# Patient Record
Sex: MALE | Race: WHITE | NOT HISPANIC OR LATINO | Employment: FULL TIME | URBAN - METROPOLITAN AREA
[De-identification: names, ages, dates, MRNs, and addresses within clinical notes are randomized per-mention and may not be internally consistent; named-entity substitution may affect disease eponyms.]

---

## 2020-07-07 ENCOUNTER — CONVERSION ENCOUNTER (OUTPATIENT)
Dept: ORTHOPEDIC SURGERY | Facility: CLINIC | Age: 50
End: 2020-07-07

## 2020-07-07 ENCOUNTER — OFFICE VISIT CONVERTED (OUTPATIENT)
Dept: ORTHOPEDIC SURGERY | Facility: CLINIC | Age: 50
End: 2020-07-07
Attending: ORTHOPAEDIC SURGERY

## 2021-05-10 NOTE — H&P
"   History and Physical      Patient Name: Xiang Russell   Patient ID: [id number]   Sex: Male   YOB: 1970        Visit Date: July 7, 2020    Provider: Magaly Cormier MD   Location: [facility]   Location Address: [address] [place] [address]  [id number]   Location Phone: [phone]          Chief Complaint  · Left Knee Pain      History Of Present Illness  Xiang Russell is a 50 year old /White male who presents for evaluation of his left knee. He injured it a few years ago when he was working at [employer], kind of like a crush injury. Subsequently, he kind of healed from that and was able to run a half-marathon and do pretty well, but in the last few months, he started having increasing pain and was concerned. Another doctor told him he thought he might have a meniscus tear.       Past Medical History  Stroke; Thyroid disorder         Past Surgical History  heart surgery; Tonsillectomy         Medication List  levothyroxine 125 mcg oral tablet         Allergy List  NO KNOWN DRUG ALLERGIES; NO KNOWN DRUG ALLERGIES         Family Medical History  Stroke         Social History  Alcohol Use (Never); .; lives with other; Recreational Drug Use (Never); Tobacco (Never); Working         Review of Systems  · Constitutional  o Denies  o : fever, chills, weight loss  · Cardiovascular  o Denies  o : chest pain, shortness of breath  · Gastrointestinal  o Denies  o : liver disease, heartburn, nausea, blood in stools  · Genitourinary  o Denies  o : painful urination, blood in urine  · Integument  o Denies  o : rash, itching  · Neurologic  o Denies  o : headache, weakness, loss of consciousness  · Musculoskeletal  o Admits  o : painful, swollen joints  · Psychiatric  o Denies  o : drug/alcohol addiction, anxiety, depression      Vitals  Date Time BP Position Site L\R Cuff Size HR RR TEMP (F) WT  HT  BMI kg/m2 BSA m2 O2 Sat HC       07/07/2020 02:20 PM      47 - R   170lbs 0oz 5'  5\" 28.29 1.88 97 %  "         Physical Examination  · Constitutional  o Appearance  o : well developed, well-nourished, no obvious deformities present  · Head and Face  o Head  o :   § Inspection  § : normocephalic  o Face  o :   § Inspection  § : no facial lesions  · Eyes  o Conjunctivae  o : conjunctivae normal  o Sclerae  o : sclerae white  · Ears, Nose, Mouth and Throat  o Ears  o :   § External Ears  § : appearance within normal limits  § Hearing  § : intact  o Nose  o :   § External Nose  § : appearance normal  · Neck  o Inspection/Palpation  o : normal appearance  o Range of Motion  o : full range of motion  · Respiratory  o Respiratory Effort  o : breathing unlabored  o Inspection of Chest  o : normal appearance  o Auscultation of Lungs  o : no audible wheezing or rales  · Cardiovascular  o Heart  o : regular rate  · Gastrointestinal  o Abdominal Examination  o : soft and non-tender  · Skin and Subcutaneous Tissue  o General Inspection  o : intact, no rashes  · Psychiatric  o General  o : Alert and oriented x3  o Judgement and Insight  o : judgment and insight intact  o Mood and Affect  o : mood normal, affect appropriate  · Left Knee  o Inspection  o : Very [id number]-zo pain. Good range of motion. Nontender to palpation. Sensation intact. Pulses normal.  · In Office Procedures  o View  o : LAT/SUNRISE/STANDING   o Site  o : left knee  o Indication  o : Left knee pain   o Study  o : X-rays ordered, taken in the office, and reviewed today.  o Xray  o : X-rays show no evidence for fracture, no dislocation. No significant arthritis.   o Comparative Data  o : No comparative data found          Assessment  · Left knee pain, unspecified chronicity     719.46/M25.562      Plan  · Orders  o Knee (Left) [facility] Preferred View ([id number]) - 719.46/M25.562 - 07/07/2020  · Medications  o Medications have been Reconciled  o Transition of Care or Provider Policy  · Instructions  o Reviewed the patient's Past Medical, Social, and Family history as  well as the ROS at today's visit, no changes.  o Call or return if worsening symptoms.  o Exercise handout given.  o Follow up as needed. If he returns, will consider an MRI.   o This note was transcribed by Moriah Deal mc.            Electronically Signed by: Sophie Mejia, -Author on July 20, 2020 11:03:19 AM  Electronically Co-signed by: Magaly Cormier MD -Reviewer on July 21, 2020 08:34:22 AM

## 2021-05-15 VITALS — WEIGHT: 170 LBS | HEART RATE: 47 BPM | OXYGEN SATURATION: 97 % | HEIGHT: 65 IN | BODY MASS INDEX: 28.32 KG/M2
